# Patient Record
Sex: MALE | URBAN - METROPOLITAN AREA
[De-identification: names, ages, dates, MRNs, and addresses within clinical notes are randomized per-mention and may not be internally consistent; named-entity substitution may affect disease eponyms.]

---

## 2019-05-06 ENCOUNTER — TELEPHONE (OUTPATIENT)
Dept: OPHTHALMOLOGY | Facility: CLINIC | Age: 29
End: 2019-05-06

## 2019-05-06 NOTE — TELEPHONE ENCOUNTER
Pt scheduled May 20th at 0900   Pt aware of date/time/location and has direct number and main clinic number.    Note to financial counselor for f/u on [facility]  Jermaine Garcia RN 10:21 AM 05/07/19        [facility] Call [facility]    Phone Message    May a detailed message be left on voicemail: yes    Reason for Call: Other: Received referral from [facility] in [place] for this patient to be seen urgently with Dr. Foss for  OS Chorodial Nevus. Dr. Mathew Heredia referring. medical records/referral to be faxed to clinic for review. Please call the patient directly for scheduling at [phone].     Action Taken: Message routed to:  [facility] ([facility] Clinic

## 2019-05-20 ENCOUNTER — OFFICE VISIT (OUTPATIENT)
Dept: OPHTHALMOLOGY | Facility: CLINIC | Age: 29
End: 2019-05-20
Attending: OPHTHALMOLOGY
Payer: COMMERCIAL

## 2019-05-20 DIAGNOSIS — H31.8 CHOROIDAL LESION: Primary | ICD-10-CM

## 2019-05-20 PROCEDURE — 92134 CPTRZ OPH DX IMG PST SGM RTA: CPT | Mod: ZF | Performed by: OPHTHALMOLOGY

## 2019-05-20 PROCEDURE — 92250 FUNDUS PHOTOGRAPHY W/I&R: CPT | Mod: ZF | Performed by: OPHTHALMOLOGY

## 2019-05-20 PROCEDURE — G0463 HOSPITAL OUTPT CLINIC VISIT: HCPCS | Mod: ZF

## 2019-05-20 PROCEDURE — 76512 OPH US DX B-SCAN: CPT | Mod: ZF | Performed by: OPHTHALMOLOGY

## 2019-05-20 ASSESSMENT — VISUAL ACUITY
OS_SC: 20/20
OD_SC: 20/25
OD_SC+: -1
OS_SC+: -1
METHOD: SNELLEN - LINEAR

## 2019-05-20 ASSESSMENT — SLIT LAMP EXAM - LIDS
COMMENTS: NORMAL
COMMENTS: NORMAL

## 2019-05-20 ASSESSMENT — EXTERNAL EXAM - RIGHT EYE: OD_EXAM: NORMAL

## 2019-05-20 ASSESSMENT — TONOMETRY
OD_IOP_MMHG: 14
IOP_METHOD: TONOPEN
OS_IOP_MMHG: 15

## 2019-05-20 ASSESSMENT — CUP TO DISC RATIO
OS_RATIO: 0.2
OD_RATIO: 0.2

## 2019-05-20 ASSESSMENT — CONF VISUAL FIELD
OD_NORMAL: 1
OS_NORMAL: 1

## 2019-05-20 ASSESSMENT — EXTERNAL EXAM - LEFT EYE: OS_EXAM: NORMAL

## 2019-05-20 NOTE — NURSING NOTE
Chief Complaints and History of Present Illnesses   Patient presents with     Consult For     Chief Complaint(s) and History of Present Illness(es)     Consult For     Laterality: left eye    Associated symptoms: Negative for eye pain, flashes and floaters    Pain scale: 0/10              Comments     [facility] in the left [facility] - [facility] when this was noted  Pt has not noted any changes in vision and notes no ocular symptoms  No eye drops, no glasses    Idalia BLAKE 9:02 AM May 20, 2019

## 2019-05-20 NOTE — PROGRESS NOTES
CC -   Choroidal nevus    INTERVAL HISTORY - Initial visit    HPI -   Nirav Hurtado is a  28 year old year-old patient referred by [facility] for evaluatoin and treat of a choroidal lesion left eye  Noticed 5/2019 during routine eye exam for spectacles, found nevus OS.  No prior eye exams      PAST OCULAR SURGERY  None    RETINAL IMAGING:  OCT  5-20-19  OD -  Macula - normal retina, PHF attached  OS -  Macula - normal retina, PHF attached   Lesion - flat, atrophic    U/S B-scan  OS - lesion flat      ASSESSMENT & PLAN    1.  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  I personally obtained the chief complaint(s) and history of present illness.  I confirmed and edited as necessary the review of systems, past medical/surgical history, family history, social history, and examination findings as documented by others; and I examined the patient myself.  I personally reviewed the relevant tests, images, and reports as documented above.  I formulated and edited as necessary the assessment and plan and discussed the findings and management plan with the patient and family    Elizabet Foss MD, PhD  , Vitreoretinal Surgery  Department of Ophthalmology  [facility]

## 2019-05-20 NOTE — LETTER
5/20/2019       RE: Nirav Hurtado  [address] [facility] [address]     Dear Colleague,    Thank you for referring your patient, Nirav Hurtado, to the EYE CLINIC at [facility]. Please see a copy of my visit note below.    CC -   Choroidal nevus    INTERVAL HISTORY - Initial visit    HPI -   Nirav Hurtado is a  28 year old year-old patient referred by [facility] for evaluatoin and treat of a choroidal lesion left eye  Noticed 5/2019 during routine eye exam for spectacles, found nevus OS.  No prior eye exams      PAST OCULAR SURGERY  None    RETINAL IMAGING:  OCT  5-20-19  OD -  Macula - normal retina, PHF attached  OS -  Macula - normal retina, PHF attached   Lesion - flat, atrophic    U/S B-scan  OS - lesion flat      ASSESSMENT & PLAN    1.  CHRPE OS   - most likely diagnosis   - location atypical for coloboma   - new diagnosis 5/2019     - recheck 6 months to verify stability   return to clinic: 6 months, photos & OCT    ATTESTATION   Attending Attestation:  Complete documentation of historical and exam elements from today's encounter can be found in the full encounter summary report (not reduplicated in this progress note).  I personally obtained the chief complaint(s) and history of present illness.  I confirmed and edited as necessary the review of systems, past medical/surgical history, family history, social history, and examination findings as documented by others; and I examined the patient myself.  I personally reviewed the relevant tests, images, and reports as documented above.  I formulated and edited as necessary the assessment and plan and discussed the findings and management plan with the patient and family. Elizabet Foss MD, PhD      Base Eye Exam     Visual Acuity (Snellen - Linear)       Right Left    Dist sc 20/25 -1 20/20 -1          Tonometry (Tonopen, 9:11 AM)       Right Left    Pressure 14 15          Pupils       Pupils Dark Light Shape React APD    Right PERRL 7.5 4 Round  Brisk None    Left PERRL 7.5 4 Round Brisk None          Visual Fields       Left Right     Full Full          Extraocular Movement       Right Left     Full Full          Neuro/Psych     Oriented x3:  Yes    Mood/Affect:  Normal          Dilation     Both eyes:  1.0% Mydriacyl, 2.5% Zheng Synephrine @ 9:11 AM            Slit Lamp and Fundus Exam     External Exam       Right Left    External Normal Normal          Slit Lamp Exam       Right Left    Lids/Lashes Normal Normal    Conjunctiva/Sclera White and quiet White and quiet    Cornea Clear Clear    Anterior Chamber Deep and quiet Deep and quiet    Iris Dilated Dilated    Lens Clear Clear    Vitreous clear, syneresis clear, syneresis          Fundus Exam       Right Left    Disc Normal Normal    C/D Ratio 0.2 0.2    Macula Normal Normal    Vessels Normal Normal    Periphery Normal large CHRPE IT 5x5 mm                Again, thank you for allowing me to participate in the care of your patient.      Sincerely,    Elizabet Foss MD, PhD  , Vitreoretinal Surgery  Department of Ophthalmology & Visual Neurosciences  [facility]